# Patient Record
Sex: FEMALE | Race: WHITE | NOT HISPANIC OR LATINO | Employment: FULL TIME | URBAN - METROPOLITAN AREA
[De-identification: names, ages, dates, MRNs, and addresses within clinical notes are randomized per-mention and may not be internally consistent; named-entity substitution may affect disease eponyms.]

---

## 2022-03-28 ENCOUNTER — CLINICAL SUPPORT (OUTPATIENT)
Dept: OBSTETRICS AND GYNECOLOGY | Facility: CLINIC | Age: 32
End: 2022-03-28
Payer: COMMERCIAL

## 2022-03-28 ENCOUNTER — OFFICE VISIT (OUTPATIENT)
Dept: OBSTETRICS AND GYNECOLOGY | Facility: CLINIC | Age: 32
End: 2022-03-28
Payer: COMMERCIAL

## 2022-03-28 ENCOUNTER — PROCEDURE VISIT (OUTPATIENT)
Dept: OBSTETRICS AND GYNECOLOGY | Facility: CLINIC | Age: 32
End: 2022-03-28
Payer: COMMERCIAL

## 2022-03-28 VITALS
SYSTOLIC BLOOD PRESSURE: 144 MMHG | WEIGHT: 234 LBS | DIASTOLIC BLOOD PRESSURE: 78 MMHG | HEART RATE: 76 BPM | BODY MASS INDEX: 39.95 KG/M2 | HEIGHT: 64 IN

## 2022-03-28 DIAGNOSIS — O03.9 MISCARRIAGE: ICD-10-CM

## 2022-03-28 DIAGNOSIS — Z01.419 WELL WOMAN EXAM WITH ROUTINE GYNECOLOGICAL EXAM: ICD-10-CM

## 2022-03-28 DIAGNOSIS — Z34.90 PREGNANCY, UNSPECIFIED GESTATIONAL AGE: Primary | ICD-10-CM

## 2022-03-28 DIAGNOSIS — Z01.419 WELL WOMAN EXAM WITH ROUTINE GYNECOLOGICAL EXAM: Primary | ICD-10-CM

## 2022-03-28 PROCEDURE — 76817 US OB/GYN PROCEDURE (VIEWPOINT): ICD-10-PCS | Mod: S$GLB,,, | Performed by: OBSTETRICS & GYNECOLOGY

## 2022-03-28 PROCEDURE — 99204 PR OFFICE/OUTPT VISIT, NEW, LEVL IV, 45-59 MIN: ICD-10-PCS | Mod: 25,S$GLB,, | Performed by: OBSTETRICS & GYNECOLOGY

## 2022-03-28 PROCEDURE — 3078F PR MOST RECENT DIASTOLIC BLOOD PRESSURE < 80 MM HG: ICD-10-PCS | Mod: CPTII,S$GLB,, | Performed by: OBSTETRICS & GYNECOLOGY

## 2022-03-28 PROCEDURE — 3008F BODY MASS INDEX DOCD: CPT | Mod: CPTII,S$GLB,, | Performed by: OBSTETRICS & GYNECOLOGY

## 2022-03-28 PROCEDURE — 3077F SYST BP >= 140 MM HG: CPT | Mod: CPTII,S$GLB,, | Performed by: OBSTETRICS & GYNECOLOGY

## 2022-03-28 PROCEDURE — 76817 TRANSVAGINAL US OBSTETRIC: CPT | Mod: S$GLB,,, | Performed by: OBSTETRICS & GYNECOLOGY

## 2022-03-28 PROCEDURE — 99204 OFFICE O/P NEW MOD 45 MIN: CPT | Mod: 25,S$GLB,, | Performed by: OBSTETRICS & GYNECOLOGY

## 2022-03-28 PROCEDURE — 3008F PR BODY MASS INDEX (BMI) DOCUMENTED: ICD-10-PCS | Mod: CPTII,S$GLB,, | Performed by: OBSTETRICS & GYNECOLOGY

## 2022-03-28 PROCEDURE — 3077F PR MOST RECENT SYSTOLIC BLOOD PRESSURE >= 140 MM HG: ICD-10-PCS | Mod: CPTII,S$GLB,, | Performed by: OBSTETRICS & GYNECOLOGY

## 2022-03-28 PROCEDURE — 3078F DIAST BP <80 MM HG: CPT | Mod: CPTII,S$GLB,, | Performed by: OBSTETRICS & GYNECOLOGY

## 2022-03-28 NOTE — PROGRESS NOTES
a  Subjective:       Patient ID: Lashonda Mendoza is a 31 y.o. female.    Chief Complaint:  Possible Pregnancy      History of Present Illness  She is doing well.  No new health issues,  No abnormal bleeding, No GI or Gu concerns,  No dyspareunia  G1  8-9 weeks   No bleeding or prob.  She wore a nexplanon.      .   HPI      GYN & OB History  Patient's last menstrual period was 2022.     Date of Last Pap: No result found    OB History    Para Term  AB Living   1             SAB IAB Ectopic Multiple Live Births                  # Outcome Date GA Lbr Sebas/2nd Weight Sex Delivery Anes PTL Lv   1 Current                Review of Systems  Review of Systems   Constitutional: Negative for activity change.   Eyes: Negative for visual disturbance.   Respiratory: Negative for shortness of breath.    Cardiovascular: Negative for chest pain.   Gastrointestinal: Negative for abdominal pain.   Genitourinary: Negative for vaginal bleeding.        No abnormal vaginal bleeding   Musculoskeletal: Negative for back pain.   Integumentary:  Negative for rash and breast mass.   Neurological: Negative for numbness.   Psychiatric/Behavioral:        No mood disturbance or changes    Breast: Negative for mass            Objective:    Physical Exam:   Constitutional: She is oriented to person, place, and time. She appears well-developed. She is cooperative.    HENT:   Head: Normocephalic.     Neck: Trachea normal. No thyromegaly present.    Cardiovascular: Normal rate, regular rhythm and normal heart sounds.     Pulmonary/Chest: Effort normal and breath sounds normal. Right breast exhibits no mass, no nipple discharge and no skin change. Left breast exhibits no mass, no nipple discharge and no skin change.        Abdominal: Soft. There is no abdominal tenderness. There is no rebound and no guarding.     Genitourinary:    Vagina and uterus normal.      Pelvic exam was performed with patient supine.   Labial bartholins  normal.There is no lesion on the right labia. There is no lesion on the left labia. Cervix is normal. Right adnexum displays no mass and no tenderness. Left adnexum displays no mass and no tenderness. Cervix exhibits no discharge. Also,  pap results normal   Uterus is not enlarged and not tender.              Lymphadenopathy:        Head (right side): No submental and no submandibular adenopathy present.        Head (left side): No submental and no submandibular adenopathy present.     She has no cervical adenopathy.    Neurological: She is alert and oriented to person, place, and time.    Skin: Skin is warm.    Psychiatric: She has a normal mood and affect. Her speech is normal and behavior is normal. Thought content normal.          Assessment:        1. Well woman exam with routine gynecological exam                 Plan:      U/s preformed today and found to have an 8 week embryo without a heart rate    Discussed expectant mgt vs D&C  Also offered a follow up ultrasound     She and her  will think about and let me know      Gt Pap discussed will return for her annual   No covid vaccine   Vaccine recommended     Pt is aware we call all results. If she does not hear from our office regarding her result within a week of having a study or procedure performed she is to call the office so that we can research the result for her.  Birth control discussed  Chaperone was present

## 2022-03-28 NOTE — PROGRESS NOTES
Patient is in today for New OB Intake.   Ultrasound revealed an 8w 2d embryo without fetal heart motion.   Dr. Franklin counseled patient and her .  They have decided to go home process the information given to them unexpectedly.   She will call me tomorrow with a decision.

## 2022-03-29 ENCOUNTER — TELEPHONE (OUTPATIENT)
Dept: OBSTETRICS AND GYNECOLOGY | Facility: CLINIC | Age: 32
End: 2022-03-29
Payer: COMMERCIAL

## 2022-03-29 NOTE — TELEPHONE ENCOUNTER
Patient is experiencing a miscarriage.   She called wanting to schedule the D&C  Patient is advised Dr Franklin is at the hospital now.  I will talk to him when he returns to clinic this afternoon and get back with her.  She denies any bleeding but has started cramping

## 2022-03-29 NOTE — TELEPHONE ENCOUNTER
This message is addressed with a phone encounter  Patient will be scheduled for a Suction D&C tomorrow morning.

## 2022-03-29 NOTE — TELEPHONE ENCOUNTER
----- Message from Ce Smith sent at 3/29/2022 12:05 PM CDT -----  Regarding: appt access  Contact: Pt  Pt is calling to schedule an appt for D&C. Please call back at [phone]//thank you acc

## 2022-03-31 DIAGNOSIS — O03.9 MISCARRIAGE: Primary | ICD-10-CM

## 2022-03-31 RX ORDER — OXYCODONE AND ACETAMINOPHEN 5; 325 MG/1; MG/1
1 TABLET ORAL EVERY 4 HOURS PRN
Qty: 28 TABLET | Refills: 0 | Status: SHIPPED | OUTPATIENT
Start: 2022-03-31

## 2022-03-31 NOTE — TELEPHONE ENCOUNTER
Phone call made to follow up on patient.  She canceled her D&C   Patient is cramping. Patient is offered pain medication if she needs it during the night.  She would like a prescription if needed

## 2022-04-05 DIAGNOSIS — O03.9 MISCARRIAGE: Primary | ICD-10-CM

## 2022-04-06 ENCOUNTER — PATIENT MESSAGE (OUTPATIENT)
Dept: OBSTETRICS AND GYNECOLOGY | Facility: CLINIC | Age: 32
End: 2022-04-06
Payer: COMMERCIAL

## 2022-04-06 RX ORDER — HYDROCODONE BITARTRATE AND ACETAMINOPHEN 5; 325 MG/1; MG/1
1 TABLET ORAL EVERY 4 HOURS PRN
Qty: 18 TABLET | Refills: 0 | Status: SHIPPED | OUTPATIENT
Start: 2022-04-06

## 2022-04-06 RX ORDER — MISOPROSTOL 200 UG/1
TABLET ORAL
Qty: 9 TABLET | Refills: 0 | Status: SHIPPED | OUTPATIENT
Start: 2022-04-06

## 2022-04-06 NOTE — TELEPHONE ENCOUNTER
Phone call returned.  Patient has not started physically miscarrying.    Dr Franklin is consulted and will E-Scribe Cytotec and Norco for her.  Patient is advised she will start cramping and bleeding.  The cramping will be intense  She us advised to go to ER with any concerns.   She will start taking tomorrow when her  can be with her.  I request she call me Friday morning to follow up.  Quant HCG will be ordered weekly until zero.   Patient verbalizes understanding

## 2022-04-06 NOTE — TELEPHONE ENCOUNTER
----- Message from Korey Styles sent at 4/6/2022 12:06 PM CDT -----  Pt would like return call regarding prescription  medication.   Please call back at .[phone].  Celeste Bourne

## 2022-04-20 ENCOUNTER — TELEPHONE (OUTPATIENT)
Dept: OBSTETRICS AND GYNECOLOGY | Facility: CLINIC | Age: 32
End: 2022-04-20
Payer: COMMERCIAL

## 2022-04-20 NOTE — TELEPHONE ENCOUNTER
I called patient to follow up.  She miscarried at home with Cytotec    She has not had Quant HCG drawn  I left a message on her voicemail requesting a response  I would like to schedule a follow up appointment and order QUANT HCG  I will attempt to reach at a later time.

## 2022-04-27 ENCOUNTER — PATIENT MESSAGE (OUTPATIENT)
Dept: OBSTETRICS AND GYNECOLOGY | Facility: CLINIC | Age: 32
End: 2022-04-27
Payer: COMMERCIAL

## 2022-04-27 DIAGNOSIS — O03.9 MISCARRIAGE: Primary | ICD-10-CM

## 2022-05-09 ENCOUNTER — TELEPHONE (OUTPATIENT)
Dept: OBSTETRICS AND GYNECOLOGY | Facility: CLINIC | Age: 32
End: 2022-05-09
Payer: COMMERCIAL

## 2022-05-09 NOTE — TELEPHONE ENCOUNTER
I called [employer], left a voicemail the staff has gone for the day.  I will call back tomorrow morning

## 2022-05-09 NOTE — TELEPHONE ENCOUNTER
----- Message from Ce Smith sent at 5/9/2022 12:15 PM CDT -----  Regarding: Short term disability  Contact: [phone]  Kalyn/Leyla Grajeda is calling to get clinical information for pt short term disability. States that she needs diagnosis with codes if available, first treatment date and next appt date, any hospital dates and discharge dates for admits and outpatient procedures, procedure type, date and CPT code if available, return to work date and if pt has any limitations upon returning to work, any referrals that were made and any new medications related to diagnosis. States that she has a secure VM that messages can be left on. Please call back at [phone]//thank you acc

## 2022-05-10 NOTE — TELEPHONE ENCOUNTER
----- Message from Darinel Atkinson sent at 5/10/2022  9:21 AM CDT -----  Contact: [employer]-Kalyn  Please call Kalyn from [employer] regarding  clinical information for patient disability claim that was filed with them      Call back # [phone]

## 2022-05-11 ENCOUNTER — PATIENT MESSAGE (OUTPATIENT)
Dept: OBSTETRICS AND GYNECOLOGY | Facility: CLINIC | Age: 32
End: 2022-05-11
Payer: COMMERCIAL

## 2022-05-11 ENCOUNTER — TELEPHONE (OUTPATIENT)
Dept: OBSTETRICS AND GYNECOLOGY | Facility: CLINIC | Age: 32
End: 2022-05-11
Payer: COMMERCIAL

## 2022-05-11 NOTE — TELEPHONE ENCOUNTER
Phone call returned to Kalyn, I left a message on her voicemail with the following information    Diagnosis: Miscarriage  Code: O03.9  D&C was scheduled on 03/30/2022  Patient canceled the surgery due to not having the funds to pay her patient portion.  She was discharged home the same day     She miscarried at home with Cytotec    Patient has not followed up with appointments or labs therefore I do not have dates for missed or return to work

## 2022-05-11 NOTE — TELEPHONE ENCOUNTER
----- Message from Nathan Vigil LPN sent at 5/11/2022  3:40 PM CDT -----  Regarding: OB SHORT TERM DISABILITY  Contact: Griselda Francisco    ----- Message -----  From: Yeimy Bernstein  Sent: 5/10/2022   1:17 PM CDT  To: Rigoberto Murphy III Staff    .Type:  Patient Returning Call    Who Called: Kalyn  Who Left Message for Patient: Tata    Does the patient know what this is regarding?:  Short term disability   Would the patient rather a call back or a response via [facility]?  Callback   Best Call Back Number:   [phone]    Additional Information:  list of information needed- can be placed on confidential voicemail     Diagnosis w codes    Return to work date w limitations    Procedure w dates and CPT codes iof available    Admissions and discharged dates if hospitalized     Referral to any other specialities or physicians    Any new Rxs for diagnosis

## 2022-05-24 ENCOUNTER — TELEPHONE (OUTPATIENT)
Dept: OBSTETRICS AND GYNECOLOGY | Facility: CLINIC | Age: 32
End: 2022-05-24
Payer: COMMERCIAL

## 2022-05-24 NOTE — TELEPHONE ENCOUNTER
----- Message from Chelo Harmon sent at 5/24/2022 12:07 PM CDT -----  Dvan calling from  [employer] calling regarding a fax sent to office 5/17. Call back number [phone] claim #[id number]. Tks

## 2022-07-27 ENCOUNTER — PATIENT MESSAGE (OUTPATIENT)
Dept: OBSTETRICS AND GYNECOLOGY | Facility: CLINIC | Age: 32
End: 2022-07-27
Payer: COMMERCIAL